# Patient Record
Sex: FEMALE | Race: BLACK OR AFRICAN AMERICAN | URBAN - METROPOLITAN AREA
[De-identification: names, ages, dates, MRNs, and addresses within clinical notes are randomized per-mention and may not be internally consistent; named-entity substitution may affect disease eponyms.]

---

## 2024-07-10 ENCOUNTER — OFFICE VISIT (OUTPATIENT)
Dept: INTEGRATIVE MEDICINE | Facility: CLINIC | Age: 49
End: 2024-07-10
Payer: COMMERCIAL

## 2024-07-10 DIAGNOSIS — M79.605 LEFT LEG PAIN: Primary | ICD-10-CM

## 2024-07-10 DIAGNOSIS — M25.551 RIGHT HIP PAIN: ICD-10-CM

## 2024-07-10 PROCEDURE — 97810 ACUP 1/> WO ESTIM 1ST 15 MIN: CPT | Performed by: ACUPUNCTURIST

## 2024-07-10 PROCEDURE — 97811 ACUP 1/> W/O ESTIM EA ADD 15: CPT | Performed by: ACUPUNCTURIST

## 2024-07-11 NOTE — PROGRESS NOTES
Shakira Tolbert is a 48 year old female No chief complaint on file..   Chief Complaint:    Left leg pain  Right hip pain      Self reported health status:     Patient reported severity of symptom(s) over the last 24 hours  With zero reporting no symptoms and 10 reporting the worst severity    Symptom 1: 3-4  Symptom 2: 4-5; but can go as high as 7-8    Response to last TX: N/A    HPI: Shakira is a pleasant 47 y/o presenting with left leg pain and right hip pain. She is a  that requires lots of physical work. She reports her leg pain and hip pain started 2-3 months. She injured her ankle 20 years ago and feels it has never been the same. Yesterday Shakira had a procedure done for Symptomatic varicose veins of left lower extremity.     Objective (Pulse, Tongue, Vitals):    Tongue:Red    Pulse:    TCM Diagnosis: Channel Obstruction    Plan of Care:  Acupuncture Therapy:    Set 1: Bilateral: SUE-3, LI-4, Point zero.    Note: After treatment Shakira reports she felt \"good\" and \"relaxed\".    Patient Goals: To reduce pain of her left leg and right hip. To improve quality of work.    Face Time: 31 minutes  Total Time: 60 minutes    Treatment performed by Agnes MCFADDEN LAc. at [facility].    No follow-ups on file.    Agnes Freeman L.AC  7/11/2024  8:16 AM

## 2024-07-17 ENCOUNTER — OFFICE VISIT (OUTPATIENT)
Dept: INTEGRATIVE MEDICINE | Facility: CLINIC | Age: 49
End: 2024-07-17
Payer: COMMERCIAL

## 2024-07-17 DIAGNOSIS — M25.551 RIGHT HIP PAIN: ICD-10-CM

## 2024-07-17 DIAGNOSIS — M79.605 LEFT LEG PAIN: Primary | ICD-10-CM

## 2024-07-17 PROCEDURE — 97811 ACUP 1/> W/O ESTIM EA ADD 15: CPT | Performed by: ACUPUNCTURIST

## 2024-07-17 PROCEDURE — 97810 ACUP 1/> WO ESTIM 1ST 15 MIN: CPT | Performed by: ACUPUNCTURIST

## 2024-07-17 NOTE — PROGRESS NOTES
Shakira Tolbert is a 48 year old female No chief complaint on file..   Chief Complaint:    Left leg pain  Right hip pain        Self reported health status:      Patient reported severity of symptom(s) over the last 24 hours  With zero reporting no symptoms and 10 reporting the worst severity     Symptom 1: 1-2  Symptom 2: 1-2 ; but can go as high as 7-8     Response to last TX 7/10/2024:  Shakira reports her leg and right hip pain significantly decreased.     She is very pleased with results is considering bring her daughter in.     HPI: Shakira is a pleasant 49 y/o presenting with left leg pain and right hip pain. She is a  that requires lots of physical work. She reports her leg pain and hip pain started 2-3 months. She injured her ankle 20 years ago and feels it has never been the same. Yesterday Shakira had a procedure done for Symptomatic varicose veins of left lower extremity.      Objective (Pulse, Tongue, Vitals):     Tongue:Red     Pulse:     TCM Diagnosis: Channel Obstruction     Plan of Care:  Acupuncture Therapy:     Set 1: Bilateral: SUE-3, GB-34, GB-35, SP-6(L), LI-4, Tyler men     Note: Shakira commented after her treatment she felt great!     Patient Goals: To reduce pain of her left leg and right hip. To improve quality of work.     Face Time: 27 minutes  Total Time:  55 minutes    Treatment performed by Agnes MCFADDEN LAc. at [facility].    No follow-ups on file.    Agnes Freeman L.AC  7/17/2024  12:01 PM

## 2024-07-24 ENCOUNTER — OFFICE VISIT (OUTPATIENT)
Dept: INTEGRATIVE MEDICINE | Facility: CLINIC | Age: 49
End: 2024-07-24
Payer: COMMERCIAL

## 2024-07-24 DIAGNOSIS — M79.605 LEFT LEG PAIN: Primary | ICD-10-CM

## 2024-07-25 NOTE — PROGRESS NOTES
Shakira Tolbert is a 48 year old female No chief complaint on file..   Chief Complaint:    Left leg pain  Right hip pain        Self reported health status:      Patient reported severity of symptom(s) over the last 24 hours  With zero reporting no symptoms and 10 reporting the worst severity     Symptom 1: 1-2  Symptom 2: 1 ; but can go as high as 7-8     Response to last TX 7/17/2024: Shakira reports feeling well.  Her hip is minor minor but her left leg pain is present.     Response to last TX 7/10/2024:  Shakira reports her leg and right hip pain significantly decreased.      She is very pleased with results is considering bring her daughter in.     HPI: Shakira is a pleasant 49 y/o presenting with left leg pain and right hip pain. She is a  that requires lots of physical work. She reports her leg pain and hip pain started 2-3 months. She injured her ankle 20 years ago and feels it has never been the same. Yesterday Shakira had a procedure done for Symptomatic varicose veins of left lower extremity.      Objective (Pulse, Tongue, Vitals):     Tongue:Red     Pulse:     TCM Diagnosis: Channel Obstruction     Plan of Care:  Acupuncture Therapy:     Set 1: Left: ST-36, KID-10, SP-9, KID-7, KID-9, KID-10    Set 2: Right: LI-11, LASHAY-5, LASHAY-3, LASHAY-4     Note:      Patient Goals: To reduce pain of her left leg and right hip. To improve quality of work.     Face Time: 26 minutes  Total Time:  50 minutes    Treatment performed by Agnes MCFADDEN LAc. at [facility].    No follow-ups on file.    Agnes Freeman L.AC  7/25/2024  8:07 AM

## 2024-07-31 ENCOUNTER — OFFICE VISIT (OUTPATIENT)
Dept: INTEGRATIVE MEDICINE | Facility: CLINIC | Age: 49
End: 2024-07-31
Payer: COMMERCIAL

## 2024-07-31 DIAGNOSIS — M79.605 LEFT LEG PAIN: Primary | ICD-10-CM

## 2024-07-31 PROCEDURE — 97810 ACUP 1/> WO ESTIM 1ST 15 MIN: CPT | Performed by: ACUPUNCTURIST

## 2024-07-31 PROCEDURE — 97811 ACUP 1/> W/O ESTIM EA ADD 15: CPT | Performed by: ACUPUNCTURIST

## 2024-08-01 NOTE — PROGRESS NOTES
Shakira Tolbert is a 49 year old female No chief complaint on file..     Chief Complaint:    Left leg pain  Right hip pain        Self reported health status:      Patient reported severity of symptom(s) over the last 24 hours  With zero reporting no symptoms and 10 reporting the worst severity     Symptom 1: 2-3  Symptom 2: 1 ; but can go as high as 7-8     Response to last TX 7/24/2024: Shakira reports her left leg pain has slightly increased and would like the focus the TX be on her leg.     Response to last TX 7/17/2024: Shakira reports feeling well.  Her hip is minor minor but her left leg pain is present.      HPI: Shakira is a pleasant 49 y/o presenting with left leg pain and right hip pain. She is a  that requires lots of physical work. She reports her leg pain and hip pain started 2-3 months. She injured her ankle 20 years ago and feels it has never been the same. Yesterday Shakira had a procedure done for Symptomatic varicose veins of left lower extremity.      Objective (Pulse, Tongue, Vitals):     Tongue:Red     Pulse:     TCM Diagnosis: Channel Obstruction     Plan of Care:  Acupuncture Therapy:     Set 1: Bilateral: KID-3, KID-7, GB-34     Set 2: Left: UB-60, UB-57, UB-56      Treatment performed by Agnes MCFADDEN LAc. at [facility].    No follow-ups on file.    Agnes Freeman L.AC  8/1/2024  7:38 AM

## 2024-08-07 ENCOUNTER — OFFICE VISIT (OUTPATIENT)
Dept: INTEGRATIVE MEDICINE | Facility: CLINIC | Age: 49
End: 2024-08-07
Payer: COMMERCIAL

## 2024-08-07 DIAGNOSIS — M25.551 RIGHT HIP PAIN: ICD-10-CM

## 2024-08-07 DIAGNOSIS — M79.605 LEFT LEG PAIN: Primary | ICD-10-CM

## 2024-08-07 PROCEDURE — 97810 ACUP 1/> WO ESTIM 1ST 15 MIN: CPT | Performed by: ACUPUNCTURIST

## 2024-08-07 PROCEDURE — 97811 ACUP 1/> W/O ESTIM EA ADD 15: CPT | Performed by: ACUPUNCTURIST

## 2024-08-08 NOTE — PROGRESS NOTES
Shakira Tolbert is a 49 year old female No chief complaint on file..     Chief Complaint:    Left leg pain  Right hip pain        Self reported health status:      Patient reported severity of symptom(s) over the last 24 hours  With zero reporting no symptoms and 10 reporting the worst severity     Symptom 1: 2-3  Symptom 2: 1 ; but can go as high as 7-8     Response to last TX 7/31/2024: Shakira reports her left leg pain now is intermittent so pain comes and goes. The pain level remains at 2-3 out of 10. She does reports to have had 3 days of no pain after the last treatment.     Response to last TX 7/24/2024: Shakira reports her left leg pain has slightly increased and would like the focus the TX be on her leg.     HPI: Shakria is a pleasant 49 y/o presenting with left leg pain and right hip pain. She is a  that requires lots of physical work. She reports her leg pain and hip pain started 2-3 months. She injured her ankle 20 years ago and feels it has never been the same. Yesterday Shakira had a procedure done for Symptomatic varicose veins of left lower extremity.      Objective (Pulse, Tongue, Vitals):     Tongue:Red     Pulse:     TCM Diagnosis: Channel Obstruction     Plan of Care:  Acupuncture Therapy:     Set 1: Left: KID-3, KID-7, KID-8, KID-9, KID-10     Set 2: Bilateral: LI-11, Tyler men    Set 3: Right: SJ-5, SJ-6, SJ-8, SJ-9, SJ-10, SJ-11, SJ-12    Note: Interested in herbs.     Treatment performed by Agnes MCFADDEN LAc. at [facility].    No follow-ups on file.    Agnes Freeman L.AC  8/8/2024  11:59 AM

## 2024-08-21 ENCOUNTER — OFFICE VISIT (OUTPATIENT)
Dept: INTEGRATIVE MEDICINE | Facility: CLINIC | Age: 49
End: 2024-08-21
Payer: COMMERCIAL

## 2024-08-21 DIAGNOSIS — M25.551 RIGHT HIP PAIN: ICD-10-CM

## 2024-08-21 DIAGNOSIS — M79.605 LEFT LEG PAIN: Primary | ICD-10-CM

## 2024-08-21 PROCEDURE — 97811 ACUP 1/> W/O ESTIM EA ADD 15: CPT | Performed by: ACUPUNCTURIST

## 2024-08-21 PROCEDURE — 97810 ACUP 1/> WO ESTIM 1ST 15 MIN: CPT | Performed by: ACUPUNCTURIST

## 2024-08-21 NOTE — PROGRESS NOTES
Shakira Tolbert is a 49 year old female No chief complaint on file..     Chief Complaint:    Left leg pain  Right hip pain        Self reported health status:      Patient reported severity of symptom(s) over the last 24 hours  With zero reporting no symptoms and 10 reporting the worst severity     Symptom 1: 2-3  Symptom 2: 1 ; but can go as high as 7-8     Response to last TX 8/7/2024: Shakira reports feeling better much less pain and noticed her the swelling of her left leg decreased.  She asked about clots: There is no swelling, no erythema, leg pain has decreased, there is no shortness of breath. I asked her to make an appointment with PCP if she is concern.  Over all she reports to be doing better.       Response to last TX 7/31/2024: Shakira reports her left leg pain now is intermittent so pain comes and goes. The pain level remains at 2-3 out of 10. She does reports to have had 3 days of no pain after the last treatment.     HPI: Shakira is a pleasant 47 y/o presenting with left leg pain and right hip pain. She is a  that requires lots of physical work. She reports her leg pain and hip pain started 2-3 months. She injured her ankle 20 years ago and feels it has never been the same. Yesterday Shakira had a procedure done for Symptomatic varicose veins of left lower extremity.      Objective (Pulse, Tongue, Vitals):     Tongue:Red     Pulse: Slippery     TCM Diagnosis: Channel Obstruction     Plan of Care:  Acupuncture Therapy:     Set 1: Right: SJ-12, SJ-13, SJ-5     Set 2: Bilateral: LI-11, ST-36, GB-34, SUE-3, Tyler men     Set 3: Left: SUE-8, KID-3, KID-7, SP-6, SP-7    Note: she is interested in herbs.     Patient Goals: To reduce pain of her left leg and right hip. To improve quality of work.     Face Time: 26 minutes  Total Time:  50 minutes    Treatment performed by Agnes MCFADDEN LAc. at [facility].    No follow-ups on file.    Agnes Freeman L.AC  8/21/2024  1:37 PM

## 2024-08-28 ENCOUNTER — OFFICE VISIT (OUTPATIENT)
Dept: INTEGRATIVE MEDICINE | Facility: CLINIC | Age: 49
End: 2024-08-28
Payer: COMMERCIAL

## 2024-08-28 DIAGNOSIS — M54.50 ACUTE RIGHT-SIDED LOW BACK PAIN WITHOUT SCIATICA: Primary | ICD-10-CM

## 2024-08-28 PROCEDURE — 97811 ACUP 1/> W/O ESTIM EA ADD 15: CPT | Performed by: ACUPUNCTURIST

## 2024-08-28 PROCEDURE — 97810 ACUP 1/> WO ESTIM 1ST 15 MIN: CPT | Performed by: ACUPUNCTURIST

## 2024-08-28 NOTE — PROGRESS NOTES
Shakira Tolbert is a 49 year old female No chief complaint on file..     Chief Complaint:    Left leg pain  Right hip pain  Right lower back pain.        Self reported health status:      Patient reported severity of symptom(s) over the last 24 hours  With zero reporting no symptoms and 10 reporting the worst severity     Symptom 1: 1-2  Symptom 2: 1  Symptom 3: 5     Response to last TX 8/21/2024: Shakira reports her left leg pain significantly decrease. She also reports the swelling decreased.     Her current complain is right low glut pain. She is not sure what she did. Her pain feels more acute with pain level of 5 out 10.    Response to last TX 8/7/2024: Shakira reports feeling better much less pain and noticed her the swelling of her left leg decreased.  She asked about clots: There is no swelling, no erythema, leg pain has decreased, there is no shortness of breath. I asked her to make an appointment with PCP if she is concern.  Over all she reports to be doing better.       HPI: Shakira is a pleasant 47 y/o presenting with left leg pain and right hip pain. She is a  that requires lots of physical work. She reports her leg pain and hip pain started 2-3 months. She injured her ankle 20 years ago and feels it has never been the same. Yesterday Shakira had a procedure done for Symptomatic varicose veins of left lower extremity.      Objective (Pulse, Tongue, Vitals):     Tongue:Red     Pulse: Slippery     TCM Diagnosis: Channel Obstruction     Plan of Care:  Acupuncture Therapy:     Set 1: Left: KID-3, KID-7, KID-8, SUE-8(2), LI-11, SJ-14, SJ-13, SJ-12, LI-15, Deltoid     Set 2: Bilateral: GB-34     Set 3: Right: GB-34, GB-33, GB-32, GB31, Orsemary points on GB channel     Note: she is interested in herbs.     Patient Goals: To reduce pain of her left leg and right hip. To improve quality of work.     Face Time: 26 minutes  Total Time:  50 minutes    Treatment performed by Agnes MCFADDEN LAc. at  [facility].    No follow-ups on file.    Agnes Freeman L.AC  8/28/2024  11:54 AM

## 2024-09-04 ENCOUNTER — APPOINTMENT (OUTPATIENT)
Dept: SLEEP MEDICINE | Age: 49
End: 2024-09-04

## 2024-09-04 VITALS
WEIGHT: 256.39 LBS | TEMPERATURE: 97.3 F | HEIGHT: 67 IN | DIASTOLIC BLOOD PRESSURE: 76 MMHG | SYSTOLIC BLOOD PRESSURE: 137 MMHG | HEART RATE: 66 BPM | BODY MASS INDEX: 40.24 KG/M2 | OXYGEN SATURATION: 93 %

## 2024-09-04 DIAGNOSIS — G47.10 HYPERSOMNIA, UNSPECIFIED: ICD-10-CM

## 2024-09-04 DIAGNOSIS — Z91.89 AT RISK FOR OBSTRUCTIVE SLEEP APNEA: Primary | ICD-10-CM

## 2024-09-04 DIAGNOSIS — E66.01 MORBID OBESITY WITH BMI OF 40.0-44.9, ADULT  (CMD): ICD-10-CM

## 2024-09-04 DIAGNOSIS — R51.9 MORNING HEADACHE: ICD-10-CM

## 2024-09-04 DIAGNOSIS — R06.83 SNORING: ICD-10-CM

## 2024-09-04 DIAGNOSIS — G47.8 NON-RESTORATIVE SLEEP: ICD-10-CM

## 2024-09-04 DIAGNOSIS — I10 HYPERTENSION, UNSPECIFIED TYPE: ICD-10-CM

## 2024-09-04 PROCEDURE — 99204 OFFICE O/P NEW MOD 45 MIN: CPT

## 2024-09-04 PROCEDURE — 3078F DIAST BP <80 MM HG: CPT

## 2024-09-04 PROCEDURE — 3075F SYST BP GE 130 - 139MM HG: CPT

## 2024-09-04 RX ORDER — ERGOCALCIFEROL 1.25 MG/1
1.25 CAPSULE, LIQUID FILLED ORAL
COMMUNITY
Start: 2024-06-22

## 2024-09-04 RX ORDER — LOSARTAN POTASSIUM AND HYDROCHLOROTHIAZIDE 12.5; 5 MG/1; MG/1
1 TABLET ORAL DAILY
COMMUNITY
Start: 2024-06-11

## 2024-09-04 ASSESSMENT — SLEEP AND FATIGUE QUESTIONNAIRES
HOW LIKELY ARE YOU TO NOD OFF OR FALL ASLEEP WHILE SITTING AND READING: HIGH CHANCE OF DOZING
HOW LIKELY ARE YOU TO NOD OFF OR FALL ASLEEP WHILE SITTING INACTIVE IN A PUBLIC PLACE: HIGH CHANCE OF DOZING
HOW LIKELY ARE YOU TO NOD OFF OR FALL ASLEEP WHILE SITTING AND TALKING TO SOMEONE: WOULD NEVER DOZE
HOW LIKELY ARE YOU TO NOD OFF OR FALL ASLEEP WHILE LYING DOWN TO REST IN THE AFTERNOON WHEN CIRCUMSTANCES PERMIT: SLIGHT CHANCE OF DOZING
HOW LIKELY ARE YOU TO NOD OFF OR FALL ASLEEP WHILE WATCHING TV: HIGH CHANCE OF DOZING
ESS TOTAL SCORE: 14
HOW LIKELY ARE YOU TO NOD OFF OR FALL ASLEEP WHEN YOU ARE A PASSENGER IN A CAR FOR AN HOUR WITHOUT A BREAK: SLIGHT CHANCE OF DOZING
HOW LIKELY ARE YOU TO NOD OFF OR FALL ASLEEP IN A CAR, WHILE STOPPED FOR A FEW MINUTES IN TRAFFIC: WOULD NEVER DOZE
HOW LIKELY ARE YOU TO NOD OFF OR FALL ASLEEP WHILE SITTING QUIETLY AFTER LUNCH WITHOUT ALCOHOL: HIGH CHANCE OF DOZING

## 2024-09-04 ASSESSMENT — ENCOUNTER SYMPTOMS
HEADACHES: 1
SLEEP DISTURBANCES DUE TO BREATHING: 0
ENDOCRINE NEGATIVE: 1
GASTROINTESTINAL NEGATIVE: 1
SNORING: 1
INSOMNIA: 0
EXCESSIVE DAYTIME SLEEPINESS: 1
EYES NEGATIVE: 1

## 2024-09-16 ENCOUNTER — E-ADVICE (OUTPATIENT)
Dept: SLEEP MEDICINE | Age: 49
End: 2024-09-16

## 2024-09-18 ENCOUNTER — OFFICE VISIT (OUTPATIENT)
Dept: INTEGRATIVE MEDICINE | Facility: CLINIC | Age: 49
End: 2024-09-18
Payer: COMMERCIAL

## 2024-09-18 DIAGNOSIS — R51.9 SINUS HEADACHE: Primary | ICD-10-CM

## 2024-09-18 DIAGNOSIS — M25.571 RIGHT ANKLE PAIN, UNSPECIFIED CHRONICITY: ICD-10-CM

## 2024-09-18 PROCEDURE — 97811 ACUP 1/> W/O ESTIM EA ADD 15: CPT | Performed by: ACUPUNCTURIST

## 2024-09-18 PROCEDURE — 97810 ACUP 1/> WO ESTIM 1ST 15 MIN: CPT | Performed by: ACUPUNCTURIST

## 2024-09-18 NOTE — PROGRESS NOTES
Shakira Tolbert is a 49 year old female No chief complaint on file..     Chief Complaint:    Front sinus headaches  Right ankle pain     Self reported health status:      Patient reported severity of symptom(s) over the last 24 hours  With zero reporting no symptoms and 10 reporting the worst severity     Symptom 1: 5  Symptom 2: 4        Response to last TX 8/28/2024: Shakira reports her lef leg pain has not been present since last treatment. She does complain of swelling of her LE after long periods of  at work ([employer]). Currently her complain is of Headache and right ankle pain.     Response to last TX 8/21/2024: Shakira reports her left leg pain significantly decrease. She also reports the swelling decreased.      Her current complain is right low glut pain. She is not sure what she did. Her pain feels more acute with pain level of 5 out 10.  HPI: Shakira is a pleasant 49 y/o presenting with left leg pain and right hip pain. She is a  that requires lots of physical work. She reports her leg pain and hip pain started 2-3 months. She injured her ankle 20 years ago and feels it has never been the same. Yesterday Shakira had a procedure done for Symptomatic varicose veins of left lower extremity.      Objective (Pulse, Tongue, Vitals):     Tongue:Red     Pulse: Slippery     TCM Diagnosis: Channel Obstruction     Plan of Care:  Acupuncture Therapy:     Set 1: Bilateral: LI-11, ST-36, GB-34, SP-9, SUE-3     Set 2: Tyler men, stevens jaja      Note: she is interested in herbs.     Patient Goals: To reduce pain of her left leg and right hip. To improve quality of work.     Face Time: 26 minutes  Total Time:  50 minutes    Self reported heal      Treatment performed by Agnes MCFADDEN LAc. at [facility].    No follow-ups on file.    Agnes [facility] L.AC  9/18/2024  11:23 AM

## 2024-09-24 ENCOUNTER — APPOINTMENT (OUTPATIENT)
Dept: SLEEP MEDICINE | Age: 49
End: 2024-09-24

## 2024-09-24 DIAGNOSIS — R51.9 MORNING HEADACHE: ICD-10-CM

## 2024-09-24 DIAGNOSIS — E66.01 MORBID OBESITY WITH BMI OF 40.0-44.9, ADULT  (CMD): ICD-10-CM

## 2024-09-24 DIAGNOSIS — G47.10 HYPERSOMNIA, UNSPECIFIED: ICD-10-CM

## 2024-09-24 DIAGNOSIS — R06.83 SNORING: ICD-10-CM

## 2024-09-24 DIAGNOSIS — G47.8 NON-RESTORATIVE SLEEP: ICD-10-CM

## 2024-09-24 DIAGNOSIS — G47.33 OBSTRUCTIVE SLEEP APNEA: Primary | ICD-10-CM

## 2024-09-24 DIAGNOSIS — Z91.89 AT RISK FOR OBSTRUCTIVE SLEEP APNEA: ICD-10-CM

## 2024-09-24 DIAGNOSIS — I10 HYPERTENSION, UNSPECIFIED TYPE: ICD-10-CM

## 2024-09-24 LAB — REPORT TEXT: NORMAL

## 2024-09-24 PROCEDURE — 95810 POLYSOM 6/> YRS 4/> PARAM: CPT | Performed by: PSYCHIATRY & NEUROLOGY

## 2024-09-25 ENCOUNTER — OFFICE VISIT (OUTPATIENT)
Dept: INTEGRATIVE MEDICINE | Facility: CLINIC | Age: 49
End: 2024-09-25
Payer: COMMERCIAL

## 2024-09-25 DIAGNOSIS — R51.9 SINUS HEADACHE: ICD-10-CM

## 2024-09-25 DIAGNOSIS — M25.571 RIGHT ANKLE PAIN, UNSPECIFIED CHRONICITY: Primary | ICD-10-CM

## 2024-09-25 PROCEDURE — 97810 ACUP 1/> WO ESTIM 1ST 15 MIN: CPT | Performed by: ACUPUNCTURIST

## 2024-09-25 PROCEDURE — 97811 ACUP 1/> W/O ESTIM EA ADD 15: CPT | Performed by: ACUPUNCTURIST

## 2024-09-25 NOTE — PROGRESS NOTES
Shakira Tolbert is a 49 year old female No chief complaint on file..     Chief Complaint:    Front sinus headaches  Right ankle pain     Self reported health status:      Patient reported severity of symptom(s) over the last 24 hours  With zero reporting no symptoms and 10 reporting the worst severity     Symptom 1: 3-4  Symptom 2: 3-4        Response to last TX 8/28/2024: Shakira reports her frontal sinus headaches are better. No left leg pain. Her right ankle is better less pain. Currently she is feeling frontal sinus pressure and some RT ankle pain.     Her current complain is right low glut pain. She is not sure what she did. Her pain feels more acute with pain level of 5 out 10.  HPI: Shakira is a pleasant 47 y/o presenting with left leg pain and right hip pain. She is a  that requires lots of physical work. She reports her leg pain and hip pain started 2-3 months. She injured her ankle 20 years ago and feels it has never been the same. Yesterday Shakira had a procedure done for Symptomatic varicose veins of left lower extremity.      Objective (Pulse, Tongue, Vitals):     Tongue:Red     Pulse: Slippery     TCM Diagnosis: Channel Obstruction     Plan of Care:  Acupuncture Therapy:     Set 1: Bilateral: SUE-3, GB-34 ,Tyler men, UB-2, yin burleson,      Set 2: Right: GB-40, SUE-8.     Note: Sent her supplement Curcumin Elite Turmeric Extract 1 capsule a day.     Patient Goals: To reduce pain of her left leg and right hip. To improve quality of work.     Face Time: 26 minutes  Total Time:  50 minutes    Treatment performed by Agnes MCFADDEN LAc. at [facility].    No follow-ups on file.    Agnes Freeman L.AC  9/25/2024  1:50 PM

## 2024-09-30 ENCOUNTER — TELEPHONE (OUTPATIENT)
Dept: INTEGRATIVE MEDICINE | Facility: CLINIC | Age: 49
End: 2024-09-30

## 2024-10-01 ASSESSMENT — ENCOUNTER SYMPTOMS
ENDOCRINE NEGATIVE: 1
HEADACHES: 1
SNORING: 1
GASTROINTESTINAL NEGATIVE: 1
EXCESSIVE DAYTIME SLEEPINESS: 1
SLEEP DISTURBANCES DUE TO BREATHING: 0
INSOMNIA: 0
EYES NEGATIVE: 1

## 2024-10-02 ENCOUNTER — APPOINTMENT (OUTPATIENT)
Dept: SLEEP MEDICINE | Age: 49
End: 2024-10-02

## 2024-10-02 VITALS
BODY MASS INDEX: 41.04 KG/M2 | OXYGEN SATURATION: 98 % | TEMPERATURE: 97.8 F | DIASTOLIC BLOOD PRESSURE: 69 MMHG | HEIGHT: 67 IN | HEART RATE: 83 BPM | WEIGHT: 261.47 LBS | SYSTOLIC BLOOD PRESSURE: 123 MMHG

## 2024-10-02 DIAGNOSIS — G47.19 EXCESSIVE DAYTIME SLEEPINESS: ICD-10-CM

## 2024-10-02 DIAGNOSIS — G47.33 OBSTRUCTIVE SLEEP APNEA (ADULT) (PEDIATRIC): Primary | ICD-10-CM

## 2024-10-02 DIAGNOSIS — I10 HYPERTENSION, UNSPECIFIED TYPE: ICD-10-CM

## 2024-10-02 DIAGNOSIS — E66.01 MORBID OBESITY WITH BMI OF 40.0-44.9, ADULT  (CMD): ICD-10-CM

## 2024-10-02 DIAGNOSIS — R06.83 SNORING: ICD-10-CM

## 2024-10-02 DIAGNOSIS — G47.34 NOCTURNAL HYPOXEMIA: ICD-10-CM

## 2024-10-02 DIAGNOSIS — R51.9 MORNING HEADACHE: ICD-10-CM

## 2024-10-02 DIAGNOSIS — G47.8 NON-RESTORATIVE SLEEP: ICD-10-CM

## 2024-10-02 PROCEDURE — 3078F DIAST BP <80 MM HG: CPT

## 2024-10-02 PROCEDURE — 3074F SYST BP LT 130 MM HG: CPT

## 2024-10-02 PROCEDURE — 99214 OFFICE O/P EST MOD 30 MIN: CPT

## 2024-10-02 ASSESSMENT — SLEEP AND FATIGUE QUESTIONNAIRES
HOW LIKELY ARE YOU TO NOD OFF OR FALL ASLEEP WHILE SITTING AND TALKING TO SOMEONE: SLIGHT CHANCE OF DOZING
HOW LIKELY ARE YOU TO NOD OFF OR FALL ASLEEP WHILE WATCHING TV: HIGH CHANCE OF DOZING
HOW LIKELY ARE YOU TO NOD OFF OR FALL ASLEEP IN A CAR, WHILE STOPPED FOR A FEW MINUTES IN TRAFFIC: WOULD NEVER DOZE
HOW LIKELY ARE YOU TO NOD OFF OR FALL ASLEEP WHILE LYING DOWN TO REST IN THE AFTERNOON WHEN CIRCUMSTANCES PERMIT: MODERATE CHANCE OF DOZING
HOW LIKELY ARE YOU TO NOD OFF OR FALL ASLEEP WHILE SITTING QUIETLY AFTER LUNCH WITHOUT ALCOHOL: MODERATE CHANCE OF DOZING
HOW LIKELY ARE YOU TO NOD OFF OR FALL ASLEEP WHILE SITTING INACTIVE IN A PUBLIC PLACE: HIGH CHANCE OF DOZING
HOW LIKELY ARE YOU TO NOD OFF OR FALL ASLEEP WHILE SITTING AND READING: HIGH CHANCE OF DOZING
ESS TOTAL SCORE: 16
HOW LIKELY ARE YOU TO NOD OFF OR FALL ASLEEP WHEN YOU ARE A PASSENGER IN A CAR FOR AN HOUR WITHOUT A BREAK: MODERATE CHANCE OF DOZING

## 2024-10-09 ENCOUNTER — OFFICE VISIT (OUTPATIENT)
Dept: INTEGRATIVE MEDICINE | Facility: CLINIC | Age: 49
End: 2024-10-09
Payer: COMMERCIAL

## 2024-10-09 DIAGNOSIS — M25.551 RIGHT HIP PAIN: ICD-10-CM

## 2024-10-09 DIAGNOSIS — M25.571 RIGHT ANKLE PAIN, UNSPECIFIED CHRONICITY: Primary | ICD-10-CM

## 2024-10-09 PROCEDURE — 97810 ACUP 1/> WO ESTIM 1ST 15 MIN: CPT | Performed by: ACUPUNCTURIST

## 2024-10-09 PROCEDURE — 97811 ACUP 1/> W/O ESTIM EA ADD 15: CPT | Performed by: ACUPUNCTURIST

## 2024-10-09 NOTE — PROGRESS NOTES
Shakira Tolbert is a 49 year old female No chief complaint on file..     Chief Complaint:    Front sinus headaches  Right ankle pain  Right hip pain     Self reported health status:      Patient reported severity of symptom(s) over the last 24 hours  With zero reporting no symptoms and 10 reporting the worst severity     Symptom 1: 3  Symptom 2: 1-2  Symptom 3: 5          Response to last TX : Shakira reports her frontal sinus headaches are better, but can flare up at times. There is less pain in her right ankle. Currently she feels pain in her right hip pain.    HPI: Shakira is a pleasant 49 y/o presenting with left leg pain and right hip pain. She is a  that requires lots of physical work. She reports her leg pain and hip pain started 2-3 months. She injured her ankle 20 years ago and feels it has never been the same. Yesterday Shakira had a procedure done for Symptomatic varicose veins of left lower extremity.      Objective (Pulse, Tongue, Vitals):     Tongue: Sticky white coat     Pulse: Slippery     TCM Diagnosis: Channel Obstruction     Plan of Care:  Acupuncture Therapy:     Set 1: Bilateral: ST-36, Tyler men     Set 2: Right: GB-34, GB-35, UB-62, UB-60, GB-33    Set 3: Left: LI-15, SJ-14, Deltoid, LI-14, LI-13, SJ-13, SJ-12, SJ-11     Note: Sent her supplement Curcumin Elite Turmeric Extract 1 capsule a day.     Patient Goals: To reduce pain of her left leg and right hip. To improve quality of work.     Face Time: 26 minutes  Total Time:  50 minutes    Treatment performed by Agnes MCFADDEN LAc. at [facility].    No follow-ups on file.    Agnes Freeman L.AC  10/9/2024  2:00 PM

## 2024-10-16 ENCOUNTER — TELEPHONE (OUTPATIENT)
Dept: INTEGRATIVE MEDICINE | Facility: CLINIC | Age: 49
End: 2024-10-16

## 2024-10-16 NOTE — TELEPHONE ENCOUNTER
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 Called to follow up/confirm next visit. Patient forgot to cancel d/t changes in work schedule.

## 2024-10-21 ENCOUNTER — TELEPHONE (OUTPATIENT)
Dept: INTEGRATIVE MEDICINE | Facility: CLINIC | Age: 49
End: 2024-10-21

## 2024-10-28 ENCOUNTER — TELEPHONE (OUTPATIENT)
Dept: INTEGRATIVE MEDICINE | Facility: CLINIC | Age: 49
End: 2024-10-28

## 2024-10-28 NOTE — TELEPHONE ENCOUNTER
Patient contacted clinic inquiring about invitation on [employer] for recommended supplements.     Please advise, Thank you

## 2024-12-11 ENCOUNTER — V-VISIT (OUTPATIENT)
Dept: SLEEP MEDICINE | Age: 49
End: 2024-12-11

## 2024-12-11 ENCOUNTER — APPOINTMENT (OUTPATIENT)
Dept: SLEEP MEDICINE | Age: 49
End: 2024-12-11

## 2024-12-11 DIAGNOSIS — E66.01 MORBID OBESITY WITH BMI OF 40.0-44.9, ADULT  (CMD): ICD-10-CM

## 2024-12-11 DIAGNOSIS — G47.33 OBSTRUCTIVE SLEEP APNEA (ADULT) (PEDIATRIC): Primary | ICD-10-CM

## 2024-12-11 PROCEDURE — 99214 OFFICE O/P EST MOD 30 MIN: CPT

## 2024-12-11 ASSESSMENT — ENCOUNTER SYMPTOMS
EXCESSIVE DAYTIME SLEEPINESS: 0
ENDOCRINE NEGATIVE: 1
WEIGHT LOSS: 1
PSYCHIATRIC NEGATIVE: 1
EYES NEGATIVE: 1
SLEEP DISTURBANCES DUE TO BREATHING: 0
RESPIRATORY NEGATIVE: 1
HEADACHES: 0
INSOMNIA: 0
NEUROLOGICAL NEGATIVE: 1
GASTROINTESTINAL NEGATIVE: 1
SNORING: 0

## 2024-12-11 ASSESSMENT — SLEEP AND FATIGUE QUESTIONNAIRES
HOW LIKELY ARE YOU TO NOD OFF OR FALL ASLEEP WHILE LYING DOWN TO REST IN THE AFTERNOON WHEN CIRCUMSTANCES PERMIT: HIGH CHANCE OF DOZING
HOW LIKELY ARE YOU TO NOD OFF OR FALL ASLEEP WHILE SITTING AND READING: SLIGHT CHANCE OF DOZING
HOW LIKELY ARE YOU TO NOD OFF OR FALL ASLEEP IN A CAR, WHILE STOPPED FOR A FEW MINUTES IN TRAFFIC: WOULD NEVER DOZE
HOW LIKELY ARE YOU TO NOD OFF OR FALL ASLEEP WHILE SITTING QUIETLY AFTER LUNCH WITHOUT ALCOHOL: HIGH CHANCE OF DOZING
HOW LIKELY ARE YOU TO NOD OFF OR FALL ASLEEP WHILE WATCHING TV: MODERATE CHANCE OF DOZING
HOW LIKELY ARE YOU TO NOD OFF OR FALL ASLEEP WHEN YOU ARE A PASSENGER IN A CAR FOR AN HOUR WITHOUT A BREAK: SLIGHT CHANCE OF DOZING
HOW LIKELY ARE YOU TO NOD OFF OR FALL ASLEEP WHILE SITTING AND TALKING TO SOMEONE: SLIGHT CHANCE OF DOZING
ESS TOTAL SCORE: 12
HOW LIKELY ARE YOU TO NOD OFF OR FALL ASLEEP WHILE SITTING INACTIVE IN A PUBLIC PLACE: SLIGHT CHANCE OF DOZING

## 2025-06-10 ENCOUNTER — TELEPHONE (OUTPATIENT)
Dept: SLEEP MEDICINE | Age: 50
End: 2025-06-10

## 2025-06-11 ENCOUNTER — APPOINTMENT (OUTPATIENT)
Dept: SLEEP MEDICINE | Age: 50
End: 2025-06-11

## 2025-06-11 DIAGNOSIS — E66.01 MORBID OBESITY WITH BMI OF 40.0-44.9, ADULT  (CMD): ICD-10-CM

## 2025-06-11 DIAGNOSIS — G47.33 OBSTRUCTIVE SLEEP APNEA (ADULT) (PEDIATRIC): Primary | ICD-10-CM

## 2025-06-11 PROCEDURE — 99213 OFFICE O/P EST LOW 20 MIN: CPT

## 2025-06-11 ASSESSMENT — ENCOUNTER SYMPTOMS
EYES NEGATIVE: 1
ENDOCRINE NEGATIVE: 1
PSYCHIATRIC NEGATIVE: 1
GASTROINTESTINAL NEGATIVE: 1
SLEEP DISTURBANCES DUE TO BREATHING: 0
INSOMNIA: 0
HEADACHES: 0
CONSTITUTIONAL NEGATIVE: 1
EXCESSIVE DAYTIME SLEEPINESS: 0
RESPIRATORY NEGATIVE: 1
NEUROLOGICAL NEGATIVE: 1
SNORING: 0

## 2025-06-11 ASSESSMENT — SLEEP AND FATIGUE QUESTIONNAIRES
HOW LIKELY ARE YOU TO NOD OFF OR FALL ASLEEP WHILE SITTING QUIETLY AFTER LUNCH WITHOUT ALCOHOL: WOULD NEVER DOZE
HOW LIKELY ARE YOU TO NOD OFF OR FALL ASLEEP WHILE WATCHING TV: SLIGHT CHANCE OF DOZING
HOW LIKELY ARE YOU TO NOD OFF OR FALL ASLEEP WHILE SITTING INACTIVE IN A PUBLIC PLACE: WOULD NEVER DOZE
HOW LIKELY ARE YOU TO NOD OFF OR FALL ASLEEP WHEN YOU ARE A PASSENGER IN A CAR FOR AN HOUR WITHOUT A BREAK: WOULD NEVER DOZE
HOW LIKELY ARE YOU TO NOD OFF OR FALL ASLEEP WHILE SITTING AND TALKING TO SOMEONE: WOULD NEVER DOZE
HOW LIKELY ARE YOU TO NOD OFF OR FALL ASLEEP WHILE LYING DOWN TO REST IN THE AFTERNOON WHEN CIRCUMSTANCES PERMIT: SLIGHT CHANCE OF DOZING
HOW LIKELY ARE YOU TO NOD OFF OR FALL ASLEEP IN A CAR, WHILE STOPPED FOR A FEW MINUTES IN TRAFFIC: WOULD NEVER DOZE
ESS TOTAL SCORE: 2
HOW LIKELY ARE YOU TO NOD OFF OR FALL ASLEEP WHILE SITTING AND READING: WOULD NEVER DOZE